# Patient Record
Sex: FEMALE | Race: BLACK OR AFRICAN AMERICAN | NOT HISPANIC OR LATINO | Employment: FULL TIME | URBAN - METROPOLITAN AREA
[De-identification: names, ages, dates, MRNs, and addresses within clinical notes are randomized per-mention and may not be internally consistent; named-entity substitution may affect disease eponyms.]

---

## 2017-07-25 ENCOUNTER — OFFICE VISIT (OUTPATIENT)
Dept: URGENT CARE | Facility: CLINIC | Age: 52
End: 2017-07-25
Payer: COMMERCIAL

## 2017-07-25 VITALS
OXYGEN SATURATION: 100 % | DIASTOLIC BLOOD PRESSURE: 98 MMHG | BODY MASS INDEX: 31.41 KG/M2 | RESPIRATION RATE: 16 BRPM | TEMPERATURE: 98 F | HEART RATE: 90 BPM | HEIGHT: 60 IN | WEIGHT: 160 LBS | SYSTOLIC BLOOD PRESSURE: 143 MMHG

## 2017-07-25 DIAGNOSIS — R53.1 WEAKNESS: Primary | ICD-10-CM

## 2017-07-25 LAB — GLUCOSE SERPL-MCNC: NORMAL MG/DL (ref 70–110)

## 2017-07-25 PROCEDURE — 82948 REAGENT STRIP/BLOOD GLUCOSE: CPT | Mod: S$GLB,,, | Performed by: NURSE PRACTITIONER

## 2017-07-25 PROCEDURE — 99204 OFFICE O/P NEW MOD 45 MIN: CPT | Mod: 25,S$GLB,, | Performed by: NURSE PRACTITIONER

## 2017-07-25 RX ORDER — METOPROLOL SUCCINATE 50 MG/1
50 TABLET, EXTENDED RELEASE ORAL DAILY
COMMUNITY
End: 2018-02-03

## 2017-07-25 RX ORDER — TRIAMTERENE AND HYDROCHLOROTHIAZIDE 37.5; 25 MG/1; MG/1
CAPSULE ORAL
COMMUNITY
Start: 2017-05-12 | End: 2022-01-28 | Stop reason: SDUPTHER

## 2017-07-25 RX ORDER — SITAGLIPTIN 100 MG/1
TABLET, FILM COATED ORAL
COMMUNITY
Start: 2017-07-24

## 2017-07-25 NOTE — PROGRESS NOTES
Subjective:       Patient ID: Evette Schultz is a 52 y.o. female.    Chief Complaint: Hypertension    Patient states she started Metoprolol today and feels as though she may faint.  Currently has an event monitor on with no calls today indicating events.  Pt states that she also takes Januvia and has not eaten today.  Has appt with EP physician at [facility] tomorrow for this and wanted to check her BP and blood sugar prior to appointment.  No other complaints.      Hypertension   This is a new problem. The current episode started today. The problem is unchanged. The problem is controlled. Pertinent negatives include no blurred vision, chest pain, headaches, orthopnea, palpitations, shortness of breath or sweats. (Patient feels like she  May faint) There are no associated agents to hypertension.     Review of Systems   Constitution: Positive for weakness. Negative for chills and fever.   HENT: Negative for headaches and sore throat.    Eyes: Negative for blurred vision.   Cardiovascular: Positive for irregular heartbeat (currently being treated for intermittent PVCs) and near-syncope. Negative for chest pain, dyspnea on exertion, leg swelling, orthopnea, palpitations and syncope.   Respiratory: Negative for shortness of breath.    Skin: Negative for rash.   Musculoskeletal: Negative for back pain and joint pain.   Gastrointestinal: Negative for abdominal pain, diarrhea, nausea and vomiting.   Psychiatric/Behavioral: The patient is not nervous/anxious.    All other systems reviewed and are negative.      Objective:      Physical Exam   Constitutional: She is oriented to person, place, and time. Vital signs are normal. She appears well-developed and well-nourished. She is cooperative.  Non-toxic appearance. She does not have a sickly appearance. She does not appear ill. No distress.   HENT:   Head: Normocephalic and atraumatic.   Right Ear: Hearing, tympanic membrane, external ear and ear canal normal.   Left Ear:  Hearing, tympanic membrane, external ear and ear canal normal.   Nose: Nose normal. No mucosal edema, rhinorrhea or nasal deformity. No epistaxis. Right sinus exhibits no maxillary sinus tenderness and no frontal sinus tenderness. Left sinus exhibits no maxillary sinus tenderness and no frontal sinus tenderness.   Mouth/Throat: Uvula is midline, oropharynx is clear and moist and mucous membranes are normal. No trismus in the jaw. Normal dentition. No uvula swelling. No posterior oropharyngeal erythema.   Eyes: Conjunctivae and lids are normal. Right eye exhibits no discharge. Left eye exhibits no discharge. No scleral icterus.   Sclera clear bilat   Neck: Trachea normal, normal range of motion, full passive range of motion without pain and phonation normal. Neck supple.   Cardiovascular: Normal rate, regular rhythm, normal heart sounds, intact distal pulses and normal pulses.   Occasional extrasystoles are present.   Pulmonary/Chest: Effort normal and breath sounds normal. No respiratory distress.   Abdominal: Soft. Normal appearance and bowel sounds are normal. She exhibits no distension, no pulsatile midline mass and no mass. There is no tenderness.   Musculoskeletal: Normal range of motion. She exhibits no edema or deformity.   Neurological: She is alert and oriented to person, place, and time. She exhibits normal muscle tone. Coordination normal.   Skin: Skin is warm, dry and intact. She is not diaphoretic. No pallor.   Psychiatric: She has a normal mood and affect. Her speech is normal and behavior is normal. Judgment and thought content normal. Cognition and memory are normal.   Nursing note and vitals reviewed.      Results for orders placed or performed in visit on 07/25/17   POCT glucose   Result Value Ref Range    POC Glucose 139mg/dl 70 - 110 mg/dL     Vitals:    07/25/17 1835 (initial VS) 07/25/17 1906 (orthostatics begin here) 07/25/17 1908 07/25/17 1909   BP: 138/77 (!) 148/88 (!) 152/83 (!) 143/98    BP Location: Right arm      Patient Position: Sitting Lying Sitting Standing   BP Method: Automatic      Pulse: 82 81 80 90   Resp: 16      Temp: 97.6 °F (36.4 °C)      TempSrc: Oral      SpO2: 100%      Weight: 72.6 kg (160 lb)      Height: 5' (1.524 m)        Assessment:       1. Weakness        Plan:       Evette was seen today for hypertension.    Diagnoses and all orders for this visit:    Weakness  -     POCT glucose    MDM:  Discussed with patient need to go to ER for worsening of symptoms, agrees to this.  F/u in AM already scheduled.    Please return here or go to the Emergency Department for any concerns or worsening of condition.  If you were prescribed antibiotics, please take them to completion.  If you were prescribed a narcotic medication, do not drive or operate heavy equipment or machinery while taking these medications.  Please follow up with your primary care doctor or specialist as needed.    If you  smoke, please stop smoking.

## 2017-07-26 NOTE — PATIENT INSTRUCTIONS
Please return here or go to the Emergency Department for any concerns or worsening of condition.  If you were prescribed antibiotics, please take them to completion.  If you were prescribed a narcotic medication, do not drive or operate heavy equipment or machinery while taking these medications.  Please follow up with your primary care doctor or specialist as needed.  If you  smoke, please stop smoking.    Weakness (Uncertain Cause)  Based on your exam today, the exact cause of your weakness is not certain. However, your weakness does not seem to be a sign of a serious illness at this time. Keep an eye on your symptoms and get medical advice as instructed below.  Home care  · Rest at home today. Do not over-exert yourself.  · Take any medicine as prescribed.  · For the next few days, drink extra fluids (unless your healthcare provider wants you to restrict fluids for other reasons). Do not skip meals.  Follow-up care  Follow up with your healthcare provider or as advised.  When to seek medical advice  Call your healthcare provider for any of the following  · Worsening of your symptoms  · Symptoms don't start getting better within 2 days  · Fever of 100.4º F (38º C) or higher, or as directed by your healthcare provider·    Call 911  Get emergency medical care for any of these:  · Chest, arm, neck, jaw or upper back pain  · Trouble breathing  · Numbness or weakness of the face, one arm or one leg  · Slurred speech, confusion, trouble speaking, walking or seeing  · Blood in vomit or stool (black or red color)  · Loss of consciousness  Date Last Reviewed: 6/10/2015  © 5727-4335 [employer]. [address] [place] [address], [place] [address]. All rights reserved. This information is not intended as a substitute for professional medical care. Always follow your healthcare professional's instructions.

## 2017-11-05 DIAGNOSIS — I47.10 PAROXYSMAL SUPRAVENTRICULAR TACHYCARDIA: Primary | ICD-10-CM

## 2017-11-05 DIAGNOSIS — R00.2 PALPITATIONS: ICD-10-CM

## 2017-11-05 DIAGNOSIS — E11.9 TYPE 2 DIABETES MELLITUS: ICD-10-CM

## 2017-12-05 ENCOUNTER — HOSPITAL ENCOUNTER (OUTPATIENT)
Dept: RADIOLOGY | Facility: HOSPITAL | Age: 52
Discharge: HOME | End: 2017-12-05
Payer: COMMERCIAL

## 2017-12-05 DIAGNOSIS — I47.10 PAROXYSMAL SUPRAVENTRICULAR TACHYCARDIA: ICD-10-CM

## 2017-12-05 DIAGNOSIS — R00.2 PALPITATIONS: ICD-10-CM

## 2017-12-05 DIAGNOSIS — E11.9 TYPE 2 DIABETES MELLITUS: ICD-10-CM

## 2017-12-05 PROCEDURE — A9577 INJ MULTIHANCE: HCPCS

## 2017-12-05 PROCEDURE — 75561 CARDIAC MRI FOR MORPH W/DYE: CPT | Mod: TC

## 2017-12-05 PROCEDURE — 75561 CARDIAC MRI FOR MORPH W/DYE: CPT | Mod: 26,,, | Performed by: RADIOLOGY

## 2017-12-05 PROCEDURE — 25500020 PHARM REV CODE 255

## 2017-12-05 RX ADMIN — GADOBENATE DIMEGLUMINE 20 ML: 529 INJECTION, SOLUTION INTRAVENOUS at 08:12

## 2018-02-03 ENCOUNTER — HOSPITAL ENCOUNTER (EMERGENCY)
Facility: OTHER | Age: 53
Discharge: HOME | End: 2018-02-03
Attending: EMERGENCY MEDICINE
Payer: COMMERCIAL

## 2018-02-03 VITALS
SYSTOLIC BLOOD PRESSURE: 157 MMHG | OXYGEN SATURATION: 100 % | WEIGHT: 160 LBS | HEART RATE: 86 BPM | TEMPERATURE: 99 F | HEIGHT: 60 IN | RESPIRATION RATE: 18 BRPM | BODY MASS INDEX: 31.41 KG/M2 | DIASTOLIC BLOOD PRESSURE: 78 MMHG

## 2018-02-03 DIAGNOSIS — R00.2 PALPITATIONS: Primary | ICD-10-CM

## 2018-02-03 LAB
ALBUMIN SERPL BCP-MCNC: 4 G/DL
ALP SERPL-CCNC: 75 U/L
ALT SERPL W/O P-5'-P-CCNC: 36 U/L
ANION GAP SERPL CALC-SCNC: 13 MMOL/L
AST SERPL-CCNC: 19 U/L
BASOPHILS # BLD AUTO: 0.03 K/UL
BASOPHILS NFR BLD: 0.6 %
BILIRUB SERPL-MCNC: 0.3 MG/DL
BUN SERPL-MCNC: 13 MG/DL
CALCIUM SERPL-MCNC: 9 MG/DL
CHLORIDE SERPL-SCNC: 105 MMOL/L
CO2 SERPL-SCNC: 24 MMOL/L
CREAT SERPL-MCNC: 0.9 MG/DL
DIFFERENTIAL METHOD: ABNORMAL
EOSINOPHIL # BLD AUTO: 0.2 K/UL
EOSINOPHIL NFR BLD: 4.3 %
ERYTHROCYTE [DISTWIDTH] IN BLOOD BY AUTOMATED COUNT: 15 %
EST. GFR  (AFRICAN AMERICAN): >60 ML/MIN/1.73 M^2
EST. GFR  (NON AFRICAN AMERICAN): >60 ML/MIN/1.73 M^2
GLUCOSE SERPL-MCNC: 222 MG/DL
HCT VFR BLD AUTO: 36.1 %
HGB BLD-MCNC: 11.7 G/DL
LYMPHOCYTES # BLD AUTO: 2.3 K/UL
LYMPHOCYTES NFR BLD: 45.3 %
MAGNESIUM SERPL-MCNC: 2.1 MG/DL
MCH RBC QN AUTO: 26.5 PG
MCHC RBC AUTO-ENTMCNC: 32.4 G/DL
MCV RBC AUTO: 82 FL
MONOCYTES # BLD AUTO: 0.4 K/UL
MONOCYTES NFR BLD: 7.8 %
NEUTROPHILS # BLD AUTO: 2.2 K/UL
NEUTROPHILS NFR BLD: 41.6 %
PHOSPHATE SERPL-MCNC: 2.3 MG/DL
PLATELET # BLD AUTO: 341 K/UL
PMV BLD AUTO: 9.2 FL
POTASSIUM SERPL-SCNC: 4 MMOL/L
PROT SERPL-MCNC: 7.7 G/DL
RBC # BLD AUTO: 4.42 M/UL
SODIUM SERPL-SCNC: 142 MMOL/L
WBC # BLD AUTO: 5.16 K/UL

## 2018-02-03 PROCEDURE — 80053 COMPREHEN METABOLIC PANEL: CPT

## 2018-02-03 PROCEDURE — 93005 ELECTROCARDIOGRAM TRACING: CPT

## 2018-02-03 PROCEDURE — 99283 EMERGENCY DEPT VISIT LOW MDM: CPT | Mod: 25

## 2018-02-03 PROCEDURE — 84100 ASSAY OF PHOSPHORUS: CPT

## 2018-02-03 PROCEDURE — 83735 ASSAY OF MAGNESIUM: CPT

## 2018-02-03 PROCEDURE — 85025 COMPLETE CBC W/AUTO DIFF WBC: CPT

## 2018-02-03 PROCEDURE — 93010 ELECTROCARDIOGRAM REPORT: CPT | Mod: ,,, | Performed by: INTERNAL MEDICINE

## 2018-02-03 PROCEDURE — 25000003 PHARM REV CODE 250: Performed by: EMERGENCY MEDICINE

## 2018-02-03 RX ORDER — SODIUM,POTASSIUM PHOSPHATES 280-250MG
1 POWDER IN PACKET (EA) ORAL
Status: COMPLETED | OUTPATIENT
Start: 2018-02-03 | End: 2018-02-03

## 2018-02-03 RX ORDER — ACETAMINOPHEN 500 MG
5000 TABLET ORAL DAILY
COMMUNITY

## 2018-02-03 RX ADMIN — POTASSIUM & SODIUM PHOSPHATES POWDER PACK 280-160-250 MG 1 PACKET: 280-160-250 PACK at 10:02

## 2018-02-04 NOTE — ED NOTES
Katey monitor tech called and stated that when the patient was experiencing shortness of breath and weakness, she threw some PVC's but was underlying SR.  They were given our fax number to send the tracings.

## 2018-02-04 NOTE — ED TRIAGE NOTES
"Pt presents with SOB, onset about an hour ago while at rest. Pt was to have ablation yesterday at [facility] for v tach but procedure was unsuccessful and was placed on heart monitor. Left groin site intact. While at home at rest, pt started with a "panicky" feeling. Pt was SOB and lightheaded and palpitations about an hour ago. Pt reports hx of similar symptoms with arrhythmias in the past. Pulse , regular rhythm. Pt in no respiratory distress, able to speak in complete sentences, no increased WOB noted. Pt in NAD.   "

## 2018-02-04 NOTE — ED PROVIDER NOTES
"Encounter Date: 2/3/2018    SCRIBE #1 NOTE: I, Virginia Deleon, jorge luis scribing for, and in the presence of, Dr. Anand.       History     Chief Complaint   Patient presents with    Shortness of Breath     Patient c/o SOB and Heart Palpitations that started 15-20 min prior to arrival.  Failed had a failed cardiac ablation yesterday and is currently wearing a holter monitor.  Patient was told to come in if any changes.      Time seen by provider: 9:07 PM    This is a 52 y.o. female with type II diabetes, asthma, and arrhythmia who presents with complaint of SOB and palpitations x about one hour ago. Pt was resting at onset of palpitations with a near syncopal, "panicky" sensation. She then felt short of breath and lightheaded. Pt had failed attempt of cardiac ablation yesterday at [facility] for ventricular tachycardia. She has been placed on a holter monitor since. She contacted holter monitor hotline in order to have tracing from today's onset of symptoms sent to ED. Pt has received full cardiac work ups in the past. She reports adverse reactions with Iodine and Tegretol. Pt does not smoke, drink, or use drugs. She denies excessive caffeine consumption. She has hx of bladder surgery and 2 C-sections. She denies any chest pain, leg swelling, anxiety, dizziness, weakness, headache, syncope, fever, chills, diaphoresis, cough, N/V/D, and abdominal pain.      The history is provided by the patient.     Review of patient's allergies indicates:   Allergen Reactions    Lodine [etodolac] Hives    Tegretol [carbamazepine] Other (See Comments)     Talk jibberish     Past Medical History:   Diagnosis Date    Arrhythmia     Arthritis     Asthma     Diabetes mellitus     Diabetes mellitus, type 2     Meniere's disease of right ear      Past Surgical History:   Procedure Laterality Date     SECTION, CLASSIC      INCONTINENCE SURGERY       Family History   Problem Relation Age of Onset    Diabetes " Father     Hypertension Father      Social History   Substance Use Topics    Smoking status: Never Smoker    Smokeless tobacco: Never Used    Alcohol use Yes      Comment: socially     Review of Systems   Constitutional: Negative for chills, diaphoresis and fever.   HENT: Negative for congestion, rhinorrhea and sore throat.    Respiratory: Positive for shortness of breath. Negative for cough and wheezing.    Cardiovascular: Positive for palpitations. Negative for chest pain and leg swelling.   Gastrointestinal: Negative for abdominal pain, diarrhea, nausea and vomiting.   Endocrine: Negative for polyuria.   Genitourinary: Negative for decreased urine volume and dysuria.   Musculoskeletal: Negative for back pain.   Skin: Negative for rash.   Allergic/Immunologic: Negative for immunocompromised state.   Neurological: Positive for light-headedness. Negative for dizziness, syncope, weakness, numbness and headaches.   Hematological: Does not bruise/bleed easily.   Psychiatric/Behavioral: Negative for confusion. The patient is not nervous/anxious.        Physical Exam     Initial Vitals [02/03/18 2051]   BP Pulse Resp Temp SpO2   (!) 146/83 97 16 98.4 °F (36.9 °C) 100 %      MAP       104         Physical Exam    Nursing note and vitals reviewed.  Constitutional: She appears well-developed and well-nourished. No distress.   HENT:   Head: Normocephalic and atraumatic.   Nose: Nose normal.   Mouth/Throat: Oropharynx is clear and moist.   Eyes: Conjunctivae and EOM are normal. Pupils are equal, round, and reactive to light.   Neck: Normal range of motion. Neck supple.   Cardiovascular: Normal rate, regular rhythm and normal heart sounds.   No murmur heard.  Pulmonary/Chest: Breath sounds normal. No respiratory distress. She has no wheezes. She has no rhonchi. She has no rales.   Abdominal: Soft. Bowel sounds are normal. There is no tenderness.   Musculoskeletal: Normal range of motion.   Neurological: She is alert and  oriented to person, place, and time. She has normal strength. No cranial nerve deficit or sensory deficit.   Skin: Skin is warm and dry. No rash noted.   Psychiatric: She has a normal mood and affect. Her behavior is normal.         ED Course   Procedures  Labs Reviewed   CBC W/ AUTO DIFFERENTIAL - Abnormal; Notable for the following:        Result Value    Hemoglobin 11.7 (*)     Hematocrit 36.1 (*)     MCH 26.5 (*)     RDW 15.0 (*)     All other components within normal limits   COMPREHENSIVE METABOLIC PANEL - Abnormal; Notable for the following:     Glucose 222 (*)     All other components within normal limits   PHOSPHORUS - Abnormal; Notable for the following:     Phosphorus 2.3 (*)     All other components within normal limits   MAGNESIUM     EKG Readings: (Independently Interpreted)   NSR at a rate of 93. No STEMI. No ectopy. No abnormal T waves.          Medical Decision Making:   Independently Interpreted Test(s):   I have ordered and independently interpreted EKG Reading(s) - see prior notes  Clinical Tests:   Lab Tests: Ordered and Reviewed  Medical Tests: Ordered and Reviewed  ED Management:  Emergent evaluation a 52-year-old female with complaint of shortness of breath and palpitations ongoing times one hour.  Patient was wearing a Holter monitor when this occurred.  I received a Holter monitor report from [employer] cardiac monitoring [employer].  This shows her Holter strips during the time of symptoms.  She had an isolated PVC and otherwise normal sinus rhythm.  There is nothing concerning about these tracings.  EKG in the ER is within normal limits.  She did not experience any dysrhythmias while on cardiac monitor during ER course.  Lab workup reveals mild hypophosphatemia which was repleted orally.  I'm comfortable with discharging the patient home to follow-up with her cardiologist as already scheduled.            Scribe Attestation:   Scribe #1: I performed the above scribed service and the documentation  accurately describes the services I performed. I attest to the accuracy of the note.    Attending Attestation:           Physician Attestation for Scribe:  Physician Attestation Statement for Scribe #1: I, Dr. Anand, reviewed documentation, as scribed by Virginia Deleon in my presence, and it is both accurate and complete.                 ED Course      Clinical Impression:     1. Palpitations                               Dahiana Anand MD  02/04/18 0101

## 2018-12-14 ENCOUNTER — HOSPITAL ENCOUNTER (EMERGENCY)
Facility: OTHER | Age: 53
Discharge: HOME | End: 2018-12-14
Attending: EMERGENCY MEDICINE
Payer: COMMERCIAL

## 2018-12-14 VITALS
RESPIRATION RATE: 18 BRPM | OXYGEN SATURATION: 97 % | HEIGHT: 60 IN | TEMPERATURE: 98 F | SYSTOLIC BLOOD PRESSURE: 124 MMHG | DIASTOLIC BLOOD PRESSURE: 95 MMHG | BODY MASS INDEX: 32.98 KG/M2 | HEART RATE: 82 BPM | WEIGHT: 168 LBS

## 2018-12-14 DIAGNOSIS — I82.409 DVT (DEEP VENOUS THROMBOSIS): ICD-10-CM

## 2018-12-14 DIAGNOSIS — I82.431 ACUTE DEEP VEIN THROMBOSIS (DVT) OF POPLITEAL VEIN OF RIGHT LOWER EXTREMITY: Primary | ICD-10-CM

## 2018-12-14 LAB
ANION GAP SERPL CALC-SCNC: 14 MMOL/L
BASOPHILS # BLD AUTO: 0.04 K/UL
BASOPHILS NFR BLD: 0.6 %
BUN SERPL-MCNC: 25 MG/DL
CALCIUM SERPL-MCNC: 9.7 MG/DL
CHLORIDE SERPL-SCNC: 104 MMOL/L
CO2 SERPL-SCNC: 24 MMOL/L
CREAT SERPL-MCNC: 0.9 MG/DL
DIFFERENTIAL METHOD: ABNORMAL
EOSINOPHIL # BLD AUTO: 0.2 K/UL
EOSINOPHIL NFR BLD: 2.8 %
ERYTHROCYTE [DISTWIDTH] IN BLOOD BY AUTOMATED COUNT: 14.5 %
EST. GFR  (AFRICAN AMERICAN): >60 ML/MIN/1.73 M^2
EST. GFR  (NON AFRICAN AMERICAN): >60 ML/MIN/1.73 M^2
GLUCOSE SERPL-MCNC: 195 MG/DL
HCT VFR BLD AUTO: 37.2 %
HGB BLD-MCNC: 11.9 G/DL
LYMPHOCYTES # BLD AUTO: 3 K/UL
LYMPHOCYTES NFR BLD: 46.2 %
MCH RBC QN AUTO: 26.1 PG
MCHC RBC AUTO-ENTMCNC: 32 G/DL
MCV RBC AUTO: 82 FL
MONOCYTES # BLD AUTO: 0.4 K/UL
MONOCYTES NFR BLD: 5.5 %
NEUTROPHILS # BLD AUTO: 2.9 K/UL
NEUTROPHILS NFR BLD: 44.7 %
PLATELET # BLD AUTO: 292 K/UL
PMV BLD AUTO: 9.4 FL
POTASSIUM SERPL-SCNC: 3.8 MMOL/L
RBC # BLD AUTO: 4.56 M/UL
SODIUM SERPL-SCNC: 142 MMOL/L
WBC # BLD AUTO: 6.54 K/UL

## 2018-12-14 PROCEDURE — 80048 BASIC METABOLIC PNL TOTAL CA: CPT

## 2018-12-14 PROCEDURE — 85025 COMPLETE CBC W/AUTO DIFF WBC: CPT

## 2018-12-14 PROCEDURE — 25000003 PHARM REV CODE 250: Performed by: EMERGENCY MEDICINE

## 2018-12-14 PROCEDURE — 99284 EMERGENCY DEPT VISIT MOD MDM: CPT

## 2018-12-14 RX ADMIN — RIVAROXABAN 15 MG: 15 TABLET, FILM COATED ORAL at 07:12

## 2018-12-14 NOTE — ED PROVIDER NOTES
"Encounter Date: 2018    SCRIBE #1 NOTE: I, Marcotari Lees, am scribing for, and in the presence of, Dr. Peña.       History     Chief Complaint   Patient presents with    Deep Vein Thrombosis     R leg pain and swelling. had 1 1/2 hr flight yesterday and pain started today.      Time seen by provider: 5:22 PM    This is a 53 y.o. female who presents with complaint of pain in her right calf that began suddenly this afternoon while she was at work. The patient reports before onset of pain, she was walking around normally, then after she sat down for a couple of hours, she began feeling the pain. The patient also reports to feel a knot in her calf. The patient denies strenuous activity that would trigger the pain. She also denies chest pain, shortness of breath, dizziness, or recent swelling of her legs. The patient reports an ultrasound was done on her right calf at work and it was found that " the vein was not closing all the way", which is why she came to the ED. The patient denies blood clots in the past, or any other leg problems. The patient reports she has DM and takes metformin and januvia for it. The patient also reports she blood glucose levels are normally in the 150's. She denies being on birth control. She denies recent surgeries, or periods of prolonged immobility. She patient reports she takes aspirin 81 mg regularly.      The history is provided by the patient.     Review of patient's allergies indicates:   Allergen Reactions    Lodine [etodolac] Hives    Tegretol [carbamazepine] Other (See Comments)     Talk jibberish     Past Medical History:   Diagnosis Date    Arrhythmia     Arthritis     Asthma     Diabetes mellitus     Diabetes mellitus, type 2     Meniere's disease of right ear      Past Surgical History:   Procedure Laterality Date     SECTION, CLASSIC      INCONTINENCE SURGERY       Family History   Problem Relation Age of Onset    Diabetes Father     Hypertension " Father      Social History     Tobacco Use    Smoking status: Never Smoker    Smokeless tobacco: Never Used   Substance Use Topics    Alcohol use: Yes     Comment: socially    Drug use: No     Review of Systems   Constitutional: Negative for fever.   HENT: Negative for sore throat.    Respiratory: Negative for shortness of breath.    Cardiovascular: Negative for chest pain and leg swelling.   Gastrointestinal: Negative for nausea.   Genitourinary: Negative for dysuria.   Musculoskeletal: Positive for myalgias (Right calf.). Negative for back pain.   Skin: Negative for rash.   Neurological: Negative for dizziness and weakness.   Hematological: Does not bruise/bleed easily.       Physical Exam     Initial Vitals [12/14/18 1651]   BP Pulse Resp Temp SpO2   138/82 88 18 98.4 °F (36.9 °C) 99 %      MAP       --         Physical Exam    Nursing note and vitals reviewed.  Constitutional: She appears well-developed and well-nourished. She is not diaphoretic. No distress.   Overweight.   HENT:   Head: Normocephalic and atraumatic.   Mouth/Throat: No oropharyngeal exudate.   Eyes: Conjunctivae and EOM are normal. Pupils are equal, round, and reactive to light. No scleral icterus.   Neck: Normal range of motion. Neck supple.   Cardiovascular: Normal rate, regular rhythm, normal heart sounds and intact distal pulses. Exam reveals no gallop and no friction rub.    No murmur heard.  Musculoskeletal: Normal range of motion.   Tenderness right posterior lateral proximal calf. Calf circumferences equal. No palpable cords. No distal edema. 2 + dorsalis pedis pulses. Normal range of motion.   Neurological: She is alert and oriented to person, place, and time.   NVID.   Skin: Skin is warm and dry. No rash noted. No erythema. No pallor.         ED Course   Procedures  Labs Reviewed   CBC W/ AUTO DIFFERENTIAL - Abnormal; Notable for the following components:       Result Value    Hemoglobin 11.9 (*)     MCH 26.1 (*)     All other  components within normal limits   BASIC METABOLIC PANEL - Abnormal; Notable for the following components:    Glucose 195 (*)     BUN, Bld 25 (*)     All other components within normal limits          Imaging Results          US Lower Extremity Veins Right (Final result)  Result time 12/14/18 18:48:37    Final result by Mario Alberto Mcclellan MD (12/14/18 18:48:37)                 Impression:      Acute nonocclusive thrombus within the right popliteal vein, consistent with right-sided lower extremity DVT.    Case discussed with Dr. Peña on 12/14/2018 at 18:47 hours.      Electronically signed by: Mario Alberto Mcclellan MD  Date:    12/14/2018  Time:    18:48             Narrative:    EXAMINATION:  US LOWER EXTREMITY VEINS RIGHT    CLINICAL HISTORY:  Acute embolism and thrombosis of unspecified deep veins of unspecified lower extremity    TECHNIQUE:  Duplex and color flow Doppler evaluation and graded compression of the right lower extremity veins was performed.    COMPARISON:  None    FINDINGS:  Right thigh veins: The common femoral, femoral, upper greater saphenous, and deep femoral veins are patent and free of thrombus.  There is a nonocclusive thrombus within the right popliteal vein.    Right calf veins: The visualized calf veins are patent.    Contralateral CFV: The contralateral (left) common femoral vein is patent and free of thrombus.    Miscellaneous: None                                 Medical Decision Making:   Clinical Tests:   Lab Tests: Ordered and Reviewed  Radiological Study: Ordered and Reviewed            Scribe Attestation:   Scribe #1: I performed the above scribed service and the documentation accurately describes the services I performed. I attest to the accuracy of the note.    Attending Attestation:           Physician Attestation for Scribe:  Physician Attestation Statement for Scribe #1: I, Dr. Peña, reviewed documentation, as scribed by Tess Lees in my presence, and it is both accurate and complete.            Patient presents with acute onset of pain in her right calf earlier today.  No trauma. Somewhat worse with walking around.  No chest pain shortness of breath dizziness syncope.  No history of DVT, no tobacco use.  She did have a short plane flight, hour and a half.  A few days ago.  No other risk factors for DVT ultrasound does confirm DVT in the leg, popliteal patient is appropriate for outpatient therapy will be started on Xarelto.  Return precautions discussed, specifically signs of worsening DVT or PE follow-up with primary care devise that will need dose adjustment after the 3 week prescription             Clinical Impression:     1. Acute deep vein thrombosis (DVT) of popliteal vein of right lower extremity    2. DVT (deep venous thrombosis)                                   Julian Peña II, MD  12/14/18 1950

## 2018-12-14 NOTE — ED NOTES
Pt presents with c/o R calf pain, onset today. Pt recently came off of a flight yesterday. Pt reports she noticed a lump in the R calf after the pain started. + nodule felt to Right calf, no redness, swelling or warmth noted. Pt is AAOx4. RR are even and unlabored. Pt is ambulatory with steady gait.

## 2019-07-29 ENCOUNTER — HOSPITAL ENCOUNTER (EMERGENCY)
Facility: OTHER | Age: 54
Discharge: HOME | End: 2019-07-29
Attending: EMERGENCY MEDICINE
Payer: COMMERCIAL

## 2019-07-29 VITALS
RESPIRATION RATE: 20 BRPM | DIASTOLIC BLOOD PRESSURE: 60 MMHG | SYSTOLIC BLOOD PRESSURE: 125 MMHG | OXYGEN SATURATION: 97 % | HEIGHT: 60 IN | TEMPERATURE: 98 F | BODY MASS INDEX: 32.59 KG/M2 | WEIGHT: 166 LBS | HEART RATE: 72 BPM

## 2019-07-29 DIAGNOSIS — R07.9 CHEST PAIN: Primary | ICD-10-CM

## 2019-07-29 DIAGNOSIS — N39.0 URINARY TRACT INFECTION WITHOUT HEMATURIA, SITE UNSPECIFIED: ICD-10-CM

## 2019-07-29 LAB
ALBUMIN SERPL BCP-MCNC: 4 G/DL (ref 3.5–5.2)
ALP SERPL-CCNC: 69 U/L (ref 55–135)
ALT SERPL W/O P-5'-P-CCNC: 24 U/L (ref 10–44)
ANION GAP SERPL CALC-SCNC: 11 MMOL/L (ref 8–16)
AST SERPL-CCNC: 19 U/L (ref 10–40)
BACTERIA #/AREA URNS HPF: ABNORMAL /HPF
BASOPHILS # BLD AUTO: 0.07 K/UL (ref 0–0.2)
BASOPHILS NFR BLD: 1.2 % (ref 0–1.9)
BILIRUB SERPL-MCNC: 0.4 MG/DL (ref 0.1–1)
BILIRUB UR QL STRIP: NEGATIVE
BNP SERPL-MCNC: 11 PG/ML (ref 0–99)
BUN SERPL-MCNC: 18 MG/DL (ref 6–20)
CALCIUM SERPL-MCNC: 9.4 MG/DL (ref 8.7–10.5)
CHLORIDE SERPL-SCNC: 103 MMOL/L (ref 95–110)
CLARITY UR: ABNORMAL
CO2 SERPL-SCNC: 24 MMOL/L (ref 23–29)
COLOR UR: YELLOW
CREAT SERPL-MCNC: 0.8 MG/DL (ref 0.5–1.4)
DIFFERENTIAL METHOD: ABNORMAL
EOSINOPHIL # BLD AUTO: 0.1 K/UL (ref 0–0.5)
EOSINOPHIL NFR BLD: 1.9 % (ref 0–8)
ERYTHROCYTE [DISTWIDTH] IN BLOOD BY AUTOMATED COUNT: 14.4 % (ref 11.5–14.5)
EST. GFR  (AFRICAN AMERICAN): >60 ML/MIN/1.73 M^2
EST. GFR  (NON AFRICAN AMERICAN): >60 ML/MIN/1.73 M^2
GLUCOSE SERPL-MCNC: 271 MG/DL (ref 70–110)
GLUCOSE UR QL STRIP: ABNORMAL
HCT VFR BLD AUTO: 39.6 % (ref 37–48.5)
HGB BLD-MCNC: 12.4 G/DL (ref 12–16)
HGB UR QL STRIP: ABNORMAL
IMM GRANULOCYTES # BLD AUTO: 0.02 K/UL (ref 0–0.04)
IMM GRANULOCYTES NFR BLD AUTO: 0.3 % (ref 0–0.5)
KETONES UR QL STRIP: NEGATIVE
LEUKOCYTE ESTERASE UR QL STRIP: ABNORMAL
LYMPHOCYTES # BLD AUTO: 2.3 K/UL (ref 1–4.8)
LYMPHOCYTES NFR BLD: 38.6 % (ref 18–48)
MCH RBC QN AUTO: 25.7 PG (ref 27–31)
MCHC RBC AUTO-ENTMCNC: 31.3 G/DL (ref 32–36)
MCV RBC AUTO: 82 FL (ref 82–98)
MICROSCOPIC COMMENT: ABNORMAL
MONOCYTES # BLD AUTO: 0.4 K/UL (ref 0.3–1)
MONOCYTES NFR BLD: 7.4 % (ref 4–15)
NEUTROPHILS # BLD AUTO: 3 K/UL (ref 1.8–7.7)
NEUTROPHILS NFR BLD: 50.6 % (ref 38–73)
NITRITE UR QL STRIP: POSITIVE
NRBC BLD-RTO: 0 /100 WBC
PH UR STRIP: 6 [PH] (ref 5–8)
PLATELET # BLD AUTO: 303 K/UL (ref 150–350)
PMV BLD AUTO: 9 FL (ref 9.2–12.9)
POTASSIUM SERPL-SCNC: 4.1 MMOL/L (ref 3.5–5.1)
PROT SERPL-MCNC: 7.8 G/DL (ref 6–8.4)
PROT UR QL STRIP: NEGATIVE
RBC # BLD AUTO: 4.83 M/UL (ref 4–5.4)
RBC #/AREA URNS HPF: 0 /HPF (ref 0–4)
SODIUM SERPL-SCNC: 138 MMOL/L (ref 136–145)
SP GR UR STRIP: 1.02 (ref 1–1.03)
SQUAMOUS #/AREA URNS HPF: 5 /HPF
TROPONIN I SERPL DL<=0.01 NG/ML-MCNC: <0.006 NG/ML (ref 0–0.03)
TSH SERPL DL<=0.005 MIU/L-ACNC: 0.74 UIU/ML (ref 0.4–4)
URN SPEC COLLECT METH UR: ABNORMAL
UROBILINOGEN UR STRIP-ACNC: NEGATIVE EU/DL
WBC # BLD AUTO: 5.85 K/UL (ref 3.9–12.7)
WBC #/AREA URNS HPF: 18 /HPF (ref 0–5)

## 2019-07-29 PROCEDURE — 87186 SC STD MICRODIL/AGAR DIL: CPT

## 2019-07-29 PROCEDURE — 25500020 PHARM REV CODE 255: Performed by: EMERGENCY MEDICINE

## 2019-07-29 PROCEDURE — 81000 URINALYSIS NONAUTO W/SCOPE: CPT

## 2019-07-29 PROCEDURE — 87086 URINE CULTURE/COLONY COUNT: CPT

## 2019-07-29 PROCEDURE — 93005 ELECTROCARDIOGRAM TRACING: CPT

## 2019-07-29 PROCEDURE — 84443 ASSAY THYROID STIM HORMONE: CPT

## 2019-07-29 PROCEDURE — 83880 ASSAY OF NATRIURETIC PEPTIDE: CPT

## 2019-07-29 PROCEDURE — 85025 COMPLETE CBC W/AUTO DIFF WBC: CPT

## 2019-07-29 PROCEDURE — 99284 EMERGENCY DEPT VISIT MOD MDM: CPT | Mod: 25

## 2019-07-29 PROCEDURE — 80053 COMPREHEN METABOLIC PANEL: CPT

## 2019-07-29 PROCEDURE — 84484 ASSAY OF TROPONIN QUANT: CPT

## 2019-07-29 PROCEDURE — 87088 URINE BACTERIA CULTURE: CPT

## 2019-07-29 PROCEDURE — 87077 CULTURE AEROBIC IDENTIFY: CPT

## 2019-07-29 PROCEDURE — 93010 EKG 12-LEAD: ICD-10-PCS | Mod: ,,, | Performed by: INTERNAL MEDICINE

## 2019-07-29 PROCEDURE — 93010 ELECTROCARDIOGRAM REPORT: CPT | Mod: ,,, | Performed by: INTERNAL MEDICINE

## 2019-07-29 RX ORDER — NEBIVOLOL 5 MG/1
10 TABLET ORAL DAILY
COMMUNITY

## 2019-07-29 RX ORDER — NITROFURANTOIN 25; 75 MG/1; MG/1
100 CAPSULE ORAL 2 TIMES DAILY
Qty: 10 CAPSULE | Refills: 0 | Status: SHIPPED | OUTPATIENT
Start: 2019-07-29 | End: 2019-08-03

## 2019-07-29 RX ORDER — GLIPIZIDE 5 MG/1
5 TABLET, FILM COATED, EXTENDED RELEASE ORAL
COMMUNITY

## 2019-07-29 RX ADMIN — IOHEXOL 75 ML: 350 INJECTION, SOLUTION INTRAVENOUS at 09:07

## 2019-07-30 NOTE — ED PROVIDER NOTES
Encounter Date: 2019    SCRIBE #1 NOTE: I, Iglesia Lyles, am scribing for, and in the presence of, Dr. Anand.       History     Chief Complaint   Patient presents with    Chest Pain     chest pain described as a heaviness and dizziness.      Time seen by provider: 8:23 PM    This is a 54 y.o. female with a history of meniere's disease of the right ear, DM, asthma, and arrhythmia who presents with complaint of chest pain that began approximately one week ago.  Pain is located in the left upper chest, and does not radiate. The patient state that her pain became constant this morning. She describes the pain as a heaviness/pressure. She denies shortness of breath, but states that it feels like she has trouble taking a deep breath. The patient was seen in the ED on 19 and treated for a DVT. Since the DVT, the patient's right lower extremity is intermittently swollen, although not at present.   She reports that she is also experiencing intermittent dizziness. The patient reports that her PCP has been working her up for dizziness that she has been going on intermittently for a while. She states that the dizziness she has been experiencing is different from her meniere's. She denies fever, chills, congestion, sore throat, chest pain, shortness of breath, palpitations, nausea, abdominal pain, and dysuria. The patient denies smoking, drinking, and illicit drug use.     The history is provided by the patient.     Review of patient's allergies indicates:   Allergen Reactions    Lodine [etodolac] Hives    Tegretol [carbamazepine] Other (See Comments)     Talk jibberish     Past Medical History:   Diagnosis Date    Arrhythmia     Arthritis     Asthma     Diabetes mellitus     Diabetes mellitus, type 2     Meniere's disease of right ear      Past Surgical History:   Procedure Laterality Date     SECTION, CLASSIC      INCONTINENCE SURGERY       Family History   Problem Relation Age of Onset     Diabetes Father     Hypertension Father      Social History     Tobacco Use    Smoking status: Never Smoker    Smokeless tobacco: Never Used   Substance Use Topics    Alcohol use: Yes     Comment: socially    Drug use: No     Review of Systems   Constitutional: Negative for chills and fever.   HENT: Negative for congestion and sore throat.    Eyes: Negative for visual disturbance.   Respiratory: Negative for cough and shortness of breath.    Cardiovascular: Positive for chest pain (described as a pressure/heaviness). Negative for palpitations.   Gastrointestinal: Negative for abdominal pain, diarrhea and vomiting.   Genitourinary: Negative for decreased urine volume, dysuria and vaginal discharge.   Musculoskeletal: Negative for joint swelling, neck pain and neck stiffness.   Skin: Negative for rash and wound.   Neurological: Positive for dizziness. Negative for weakness, numbness and headaches.   Psychiatric/Behavioral: Negative for confusion.       Physical Exam     Initial Vitals [07/29/19 1955]   BP Pulse Resp Temp SpO2   (!) 154/80 83 16 98.9 °F (37.2 °C) 100 %      MAP       --         Physical Exam    Nursing note and vitals reviewed.  Constitutional: She appears well-developed and well-nourished. She is not diaphoretic. No distress.   HENT:   Head: Normocephalic and atraumatic.   Mouth/Throat: Oropharynx is clear and moist.   Eyes: EOM are normal. Pupils are equal, round, and reactive to light. Right eye exhibits no discharge. Left eye exhibits no discharge.   Neck: Normal range of motion.   Cardiovascular: Normal rate, regular rhythm, normal heart sounds and intact distal pulses. Exam reveals no gallop and no friction rub.    No murmur heard.  Radial pulses 2+ and equal.   Pulmonary/Chest: Breath sounds normal. No respiratory distress. She has no wheezes. She has no rhonchi. She has no rales.   Abdominal: Soft. There is no tenderness. There is no rebound and no guarding.   Musculoskeletal: Normal range  of motion. She exhibits no edema or tenderness.   Neurological: She is alert and oriented to person, place, and time. She has normal strength. No cranial nerve deficit or sensory deficit. GCS score is 15. GCS eye subscore is 4. GCS verbal subscore is 5. GCS motor subscore is 6.   Skin: Skin is warm and dry. No rash and no abscess noted. No erythema. No pallor.   Psychiatric: She has a normal mood and affect. Her behavior is normal. Judgment and thought content normal.         ED Course   Procedures  Labs Reviewed   CBC W/ AUTO DIFFERENTIAL - Abnormal; Notable for the following components:       Result Value    Mean Corpuscular Hemoglobin 25.7 (*)     Mean Corpuscular Hemoglobin Conc 31.3 (*)     MPV 9.0 (*)     All other components within normal limits   COMPREHENSIVE METABOLIC PANEL - Abnormal; Notable for the following components:    Glucose 271 (*)     All other components within normal limits   URINALYSIS, REFLEX TO URINE CULTURE - Abnormal; Notable for the following components:    Appearance, UA Hazy (*)     Glucose, UA 1+ (*)     Occult Blood UA Trace (*)     Nitrite, UA Positive (*)     Leukocytes, UA 1+ (*)     All other components within normal limits    Narrative:     Preferred Collection Type->Urine, Clean Catch   URINALYSIS MICROSCOPIC - Abnormal; Notable for the following components:    WBC, UA 18 (*)     Bacteria Few (*)     All other components within normal limits    Narrative:     Preferred Collection Type->Urine, Clean Catch   CULTURE, URINE   TROPONIN I   TSH   B-TYPE NATRIURETIC PEPTIDE     EKG Readings: (Independently Interpreted)   Initial Reading: No STEMI.   Normal sinus rhythm at a rate of 68 bpm. No STEMI. Non specific T wave abnormality in lead 3 only.        Imaging Results          CTA Chest Non-Coronary (PE Study) (Final result)  Result time 07/29/19 22:06:54    Final result by Gloria Pino MD (07/29/19 22:06:54)                 Impression:      No pulmonary embolism.    5.8 mm  indeterminate nodule left upper lobe.  3 mm indeterminate nodule right upper lobe.  If there are risk factors, follow-up CT at 12 months is optional per Flesalner 2017.    No acute lung disease or interstitial edema.      Electronically signed by: Gloria Odette  Date:    07/29/2019  Time:    22:06             Narrative:    EXAMINATION:  CTA CHEST NON CORONARY    CLINICAL HISTORY:  Chest pain, acute, PE suspected, high pretest prob;    TECHNIQUE:  Low dose axial images, sagittal and coronal reformations were obtained from the thoracic inlet to the lung bases following the IV administration of 75 mL of Omnipaque 350.  Contrast timing was optimized to evaluate the pulmonary arteries.  MIP images were performed.    COMPARISON:  None    FINDINGS:  Mediastinum: The heart is normal size.  No pericardial effusion.  The thoracic aorta appears normal.  The visualized esophagus in thyroid appear grossly normal.  No hilar or mediastinal or axillary or supraclavicular adenopathy found.    Abdomen: The visualized liver, spleen, left kidney and stomach appear normal.    Pulmonary arteries: Main pulmonary artery measures 2.3 cm within normal limits.  The bilateral pulmonary artery trunks and lobar and segmental pulmonary arteries appear normal.    Pleura: No pneumothorax or pleural effusion.    Lungs: No interstitial edema or bronchiectasis or honeycombing.  No gross endobronchial debris or fluid or wall thickening.  No ground-glass disease pattern or subpleural reticulation.  No consolidation.  MIP images are reviewed.  There is a 5.8 mm nodule of the left upper lobe abutting the major fissure axial image 259.  There is a 3 mm nodule right upper lobe axial image 259.    Skeleton: No rib fracture.  No compression deformity or subluxation of the visualized spine.  Sternum appears intact.                                 Medical Decision Making:   Independently Interpreted Test(s):   I have ordered and independently interpreted EKG  Reading(s) - see prior notes  Clinical Tests:   Lab Tests: Ordered and Reviewed  Radiological Study: Ordered and Reviewed  Medical Tests: Ordered and Reviewed  ED Management:  Emergent evaluation a 54-year-old female with complaint of chest pain, constant today, intermittent for several weeks.  She also reports intermittent dizziness.  Vital signs reveal mild hypertension, afebrile.  Physical exam is benign.  EKG shows no acute ischemic change, nonspecific abnormality.  Pain is been ongoing all day, and screening troponin is negative.  Given the long duration of pain I do not think a repeat troponin is necessary at this time.  Patient does have a history of DVT and is not currently anticoagulated.  I was concerned for possible PE.  CTA shows no PE.  There is no pneumonia or pneumothorax.  Workup does reveal UTI.  She is discharged in good condition with prescription for Macrobid for this.  Vital signs remained stable and no dysrhythmias throughout ER course.  She is in fact wearing a Holter monitor.  She is encouraged to keep her close follow-up and to return to the ED for new or worsening symptoms.                      Clinical Impression:     1. Chest pain    2. Urinary tract infection without hematuria, site unspecified                                   Dahiana Anand MD  07/30/19 4647

## 2019-08-01 LAB — BACTERIA UR CULT: ABNORMAL

## 2019-12-13 ENCOUNTER — HOSPITAL ENCOUNTER (EMERGENCY)
Facility: OTHER | Age: 54
Discharge: HOME | End: 2019-12-13
Attending: EMERGENCY MEDICINE
Payer: COMMERCIAL

## 2019-12-13 VITALS
BODY MASS INDEX: 32.59 KG/M2 | HEART RATE: 89 BPM | DIASTOLIC BLOOD PRESSURE: 75 MMHG | SYSTOLIC BLOOD PRESSURE: 174 MMHG | OXYGEN SATURATION: 99 % | RESPIRATION RATE: 18 BRPM | WEIGHT: 166 LBS | HEIGHT: 60 IN | TEMPERATURE: 99 F

## 2019-12-13 DIAGNOSIS — M79.89 LEG SWELLING: Primary | ICD-10-CM

## 2019-12-13 DIAGNOSIS — M79.89 CALF SWELLING: ICD-10-CM

## 2019-12-13 PROCEDURE — 99284 EMERGENCY DEPT VISIT MOD MDM: CPT | Mod: 25

## 2019-12-13 RX ORDER — FLECAINIDE ACETATE 50 MG/1
50 TABLET ORAL EVERY 12 HOURS
COMMUNITY

## 2019-12-13 RX ORDER — ACEBUTOLOL HYDROCHLORIDE 200 MG/1
200 CAPSULE ORAL 2 TIMES DAILY
COMMUNITY

## 2019-12-13 NOTE — ED PROVIDER NOTES
Encounter Date: 2019       History     Chief Complaint   Patient presents with    Leg Swelling     +Pt reporting R calf swelling with pain x2 hours, PMH of DVT. Currently on Xarelto. Denies SOB.      54-year-old female with history of DVT currently on Xarelto, diabetes, asthma, arthritis, Meniere's disease of the right ear, arrhythmia presents to the emergency department with complaints of right calf swelling for the last hour.  She reports history of DVT and is on long-term anticoagulants.  She states that she was diagnosed last December and was treated for 3 months and then the Xarelto was discontinued secondary to it being an provoked DVT.  She states that it was not provoked and was restarted on Xarelto in September.  She denies chest pain or shortness of breath.  She states that someone at work ultrasounded her leg and advised her to come to the ER due to concern for possible new DVT.  Patient reports pain to the posterior popliteal fossa.    The history is provided by the patient.     Review of patient's allergies indicates:   Allergen Reactions    Lodine [etodolac] Hives    Tegretol [carbamazepine] Other (See Comments)     Talk jibberish     Past Medical History:   Diagnosis Date    Anticoagulant long-term use     Arrhythmia     Arthritis     Asthma     Diabetes mellitus     Diabetes mellitus, type 2     Meniere's disease of right ear      Past Surgical History:   Procedure Laterality Date     SECTION, CLASSIC      INCONTINENCE SURGERY       Family History   Problem Relation Age of Onset    Diabetes Father     Hypertension Father      Social History     Tobacco Use    Smoking status: Never Smoker    Smokeless tobacco: Never Used   Substance Use Topics    Alcohol use: Yes     Comment: socially    Drug use: No     Review of Systems   Constitutional: Negative for chills and fever.   Respiratory: Negative for shortness of breath.    Cardiovascular: Positive for leg swelling.  Negative for chest pain.   Musculoskeletal: Positive for arthralgias and myalgias.        Right lower leg   Skin: Negative for rash.   Neurological: Negative for weakness and numbness.   Hematological: Does not bruise/bleed easily.       Physical Exam     Initial Vitals [12/13/19 1627]   BP Pulse Resp Temp SpO2   (!) 174/75 89 18 98.6 °F (37 °C) 99 %      MAP       --         Physical Exam    Nursing note and vitals reviewed.  Constitutional: She appears well-developed and well-nourished. She is not diaphoretic.  Non-toxic appearance. No distress.   HENT:   Head: Normocephalic and atraumatic.   Right Ear: External ear normal.   Left Ear: External ear normal.   Nose: Nose normal.   Mouth/Throat: Uvula is midline, oropharynx is clear and moist and mucous membranes are normal. Mucous membranes are not dry.   Eyes: Conjunctivae and lids are normal. No scleral icterus.   Neck: Normal range of motion and phonation normal.   Cardiovascular: Normal rate, regular rhythm, normal heart sounds, intact distal pulses and normal pulses. Exam reveals no decreased pulses.    Pulses:       Dorsalis pedis pulses are 2+ on the right side, and 2+ on the left side.   Pulmonary/Chest: Effort normal and breath sounds normal. No respiratory distress. She has no decreased breath sounds. She has no wheezes. She has no rhonchi. She has no rales.   Musculoskeletal: Normal range of motion.   Negative Victorino sign. No significant swelling noted to either lower extremities. No palpable cords or masses. No color change. FROM of the knee. Intact distal pulses with no sensory deficits.    Neurological: She is alert and oriented to person, place, and time.   Skin: Skin is warm, dry and intact. Capillary refill takes less than 2 seconds. No abrasion, no bruising, no burn, no ecchymosis, no laceration, no lesion, no rash and no abscess noted. No erythema.   Psychiatric: She has a normal mood and affect. Her speech is normal and behavior is normal.  Judgment normal. Cognition and memory are normal.         ED Course   Procedures  Labs Reviewed - No data to display       Imaging Results          US Lower Extremity Veins Right (Final result)  Result time 12/13/19 17:40:25    Final result by Ari Powell MD (12/13/19 17:40:25)                 Impression:      No evidence of deep venous thrombosis in the right lower extremity.  Previously identified thrombus within the right popliteal vein no longer identified.      Electronically signed by: Ari Powell MD  Date:    12/13/2019  Time:    17:40             Narrative:    EXAMINATION:  US LOWER EXTREMITY VEINS RIGHT    CLINICAL HISTORY:  Other specified soft tissue disorders    TECHNIQUE:  Duplex and color flow Doppler evaluation and graded compression of the right lower extremity veins was performed.    COMPARISON:  12/14/2018    FINDINGS:  Duplex and color flow Doppler evaluation does not reveal any evidence of acute venous thrombosis in the common femoral, superficial femoral, greater saphenous, popliteal, peroneal, anterior tibial and posterior tibial veins of the right lower extremity.  There is no reflux to suggest valvular incompetence.                                 Medical Decision Making:   History:   Old Medical Records: I decided to obtain old medical records.  Initial Assessment:   54-year-old female with complaints consistent with right leg swelling.  Afebrile neurovascularly intact.  She is alert and healthy and nontoxic appearing.  She is not distressed.  Exam documented above.  No obvious swelling noted on my exam.  No palpable cords, masses or lesions. No evidence of serious bacterial infection, cellulitis, abscess or lymphangitis.  Patient with known history of DVT.  Expresses concern for possible thrombosis.  I did consider popliteal cyst as source of patient's symptoms.  Clinical Tests:   Radiological Study: Ordered and Reviewed  ED Management:  Ultrasound of the right lower extremity  obtained with no evidence of DVT.  Will discharge home with care instructions.  She is urged to follow up with her doctor on Monday or return to the emergency department for any worsening signs or symptoms. She is urged to continue taking her medications as prescribed.  She states understanding and is in agreement this plan.  This is the extent of patient's complaints today.  This note was created using [employer] dictation.  There may be typographical errors secondary to dictation.                                   Clinical Impression:     1. Leg swelling    2. Calf swelling            Disposition:   Disposition: Discharged  Condition: Stable                     Ct Gonzalez PA-C  12/13/19 6003

## 2019-12-13 NOTE — ED NOTES
Hx DVT with chronic R leg swelling. Reports right back of calf pain starting today 3/10. Pulses present to BLE.

## 2021-06-30 ENCOUNTER — TELEPHONE (OUTPATIENT)
Dept: OTOLARYNGOLOGY | Facility: CLINIC | Age: 56
End: 2021-06-30

## 2021-07-02 ENCOUNTER — TELEPHONE (OUTPATIENT)
Dept: OTOLARYNGOLOGY | Facility: CLINIC | Age: 56
End: 2021-07-02

## 2021-07-07 DIAGNOSIS — H91.90 HEARING DISORDER, UNSPECIFIED LATERALITY: Primary | ICD-10-CM

## 2021-10-20 ENCOUNTER — TELEPHONE (OUTPATIENT)
Dept: OTOLARYNGOLOGY | Facility: CLINIC | Age: 56
End: 2021-10-20

## 2021-11-22 ENCOUNTER — CLINICAL SUPPORT (OUTPATIENT)
Dept: OTOLARYNGOLOGY | Facility: CLINIC | Age: 56
End: 2021-11-22
Payer: COMMERCIAL

## 2021-11-22 ENCOUNTER — OFFICE VISIT (OUTPATIENT)
Dept: OTOLARYNGOLOGY | Facility: CLINIC | Age: 56
End: 2021-11-22
Payer: COMMERCIAL

## 2021-11-22 VITALS
WEIGHT: 157.88 LBS | TEMPERATURE: 98 F | HEIGHT: 60 IN | SYSTOLIC BLOOD PRESSURE: 113 MMHG | BODY MASS INDEX: 30.99 KG/M2 | HEART RATE: 81 BPM | DIASTOLIC BLOOD PRESSURE: 70 MMHG

## 2021-11-22 DIAGNOSIS — Z97.4 WEARS HEARING AID IN RIGHT EAR: ICD-10-CM

## 2021-11-22 DIAGNOSIS — R29.2 ABNORMAL ACOUSTIC REFLEX: ICD-10-CM

## 2021-11-22 DIAGNOSIS — H93.299 REDUCED SPEECH DISCRIMINATION: ICD-10-CM

## 2021-11-22 DIAGNOSIS — H90.3 ASYMMETRICAL SENSORINEURAL HEARING LOSS: ICD-10-CM

## 2021-11-22 DIAGNOSIS — H81.01 MENIERE'S DISEASE OF RIGHT EAR: ICD-10-CM

## 2021-11-22 DIAGNOSIS — Z82.0 FAMILY HISTORY OF MIGRAINE: ICD-10-CM

## 2021-11-22 DIAGNOSIS — Z86.39 HISTORY OF DIABETES MELLITUS: ICD-10-CM

## 2021-11-22 DIAGNOSIS — H93.11 TINNITUS, RIGHT EAR: ICD-10-CM

## 2021-11-22 DIAGNOSIS — Z86.69 HISTORY OF MIGRAINE: ICD-10-CM

## 2021-11-22 DIAGNOSIS — H90.3 ASYMMETRICAL SENSORINEURAL HEARING LOSS: Primary | ICD-10-CM

## 2021-11-22 DIAGNOSIS — Z71.89 ENCOUNTER FOR HEARING AID CONSULTATION: Primary | ICD-10-CM

## 2021-11-22 DIAGNOSIS — Z85.6 H/O: CML (CHRONIC MYELOID LEUKEMIA): ICD-10-CM

## 2021-11-22 PROCEDURE — 99499 UNLISTED E&M SERVICE: CPT | Mod: S$GLB,,, | Performed by: AUDIOLOGIST-HEARING AID FITTER

## 2021-11-22 PROCEDURE — 92550 PR TYMPANOMETRY AND REFLEX THRESHOLD MEASUREMENTS: ICD-10-PCS | Mod: S$GLB,,, | Performed by: AUDIOLOGIST-HEARING AID FITTER

## 2021-11-22 PROCEDURE — 99204 OFFICE O/P NEW MOD 45 MIN: CPT | Mod: S$GLB,,, | Performed by: OTOLARYNGOLOGY

## 2021-11-22 PROCEDURE — 92550 TYMPANOMETRY & REFLEX THRESH: CPT | Mod: S$GLB,,, | Performed by: AUDIOLOGIST-HEARING AID FITTER

## 2021-11-22 PROCEDURE — 99499 NO LOS: ICD-10-PCS | Mod: S$GLB,,, | Performed by: AUDIOLOGIST-HEARING AID FITTER

## 2021-11-22 PROCEDURE — V5160 PR DISPENSING FEE BINAURAL: ICD-10-PCS | Mod: S$GLB,,, | Performed by: AUDIOLOGIST-HEARING AID FITTER

## 2021-11-22 PROCEDURE — V5160 DISPENSING FEE BINAURAL: HCPCS | Mod: S$GLB,,, | Performed by: AUDIOLOGIST-HEARING AID FITTER

## 2021-11-22 PROCEDURE — 99204 PR OFFICE/OUTPT VISIT, NEW, LEVL IV, 45-59 MIN: ICD-10-PCS | Mod: S$GLB,,, | Performed by: OTOLARYNGOLOGY

## 2021-11-22 PROCEDURE — 92557 PR COMPREHENSIVE HEARING TEST: ICD-10-PCS | Mod: S$GLB,,, | Performed by: AUDIOLOGIST-HEARING AID FITTER

## 2021-11-22 PROCEDURE — 92557 COMPREHENSIVE HEARING TEST: CPT | Mod: S$GLB,,, | Performed by: AUDIOLOGIST-HEARING AID FITTER

## 2021-11-22 RX ORDER — INSULIN GLARGINE 100 [IU]/ML
INJECTION, SOLUTION SUBCUTANEOUS
COMMUNITY
Start: 2021-10-13

## 2021-12-20 ENCOUNTER — TELEPHONE (OUTPATIENT)
Dept: OTOLARYNGOLOGY | Facility: CLINIC | Age: 56
End: 2021-12-20
Payer: COMMERCIAL

## 2022-01-03 ENCOUNTER — TELEPHONE (OUTPATIENT)
Dept: OTOLARYNGOLOGY | Facility: CLINIC | Age: 57
End: 2022-01-03
Payer: COMMERCIAL

## 2022-01-03 NOTE — TELEPHONE ENCOUNTER
Spoke with patient to come at 2:30pm to see Dr. Johnson / SUSAN saldivar are in and see Dr. Ramirez at 3:30pm

## 2022-01-10 ENCOUNTER — CLINICAL SUPPORT (OUTPATIENT)
Dept: OTOLARYNGOLOGY | Facility: CLINIC | Age: 57
End: 2022-01-10
Payer: COMMERCIAL

## 2022-01-10 DIAGNOSIS — Z46.1 ENCOUNTER FOR HEARING AID FITTING OF RIGHT EAR: Primary | ICD-10-CM

## 2022-01-10 PROCEDURE — NOLTAX-P ORLEANS PRESCRIBED 4.5%: ICD-10-PCS | Mod: S$GLB,,, | Performed by: AUDIOLOGIST-HEARING AID FITTER

## 2022-01-10 PROCEDURE — NOLTAX-P ORLEANS PRESCRIBED 4.5%: Mod: S$GLB,,, | Performed by: AUDIOLOGIST-HEARING AID FITTER

## 2022-01-10 PROCEDURE — V5256 HEARING AID, DIGIT, MON, ITE: HCPCS | Mod: S$GLB,,, | Performed by: AUDIOLOGIST-HEARING AID FITTER

## 2022-01-10 PROCEDURE — V5256 PR HEARING AID, DIGIT, MON, ITE: ICD-10-PCS | Mod: S$GLB,,, | Performed by: AUDIOLOGIST-HEARING AID FITTER

## 2022-01-10 PROCEDURE — 99499 NO LOS: ICD-10-PCS | Mod: S$GLB,,, | Performed by: AUDIOLOGIST-HEARING AID FITTER

## 2022-01-10 PROCEDURE — 99499 UNLISTED E&M SERVICE: CPT | Mod: S$GLB,,, | Performed by: AUDIOLOGIST-HEARING AID FITTER

## 2022-01-10 NOTE — PROGRESS NOTES
Donell Pritchett, CCC-A  Audiologist - [facility] [address] [place] [address]  [email or web address]  [phone]    Patient: Evette Schultz   MRN: [id number]  [address] [facility]  Home Phone [phone]   Work Phone Not on file.   Mobile [phone]   : 1965  VASQUEZ: 1/10/2022      HEARING AID FITTING    Right ear:  Serial number: [id number]  : ReSound  Model: LiNX Quattro 9 ([id number])  Type: ITE  Color: Medium  Battery size:  312 (brown)  Warranty expiration: 2025  L and D expiration: 2025    Dispensed Right hearing aid today at Comfort user with default settings.  Upon insertion, patient noted feels good in her ear and calibrated.  Successfully updated firmware 0.43.0.0 --> 1.18.1.0.  Per patient request for hearing aid on auto, removed P2 and gave All-Around only in P1 - did not pair to phone.  Verified program button and volume control are activated.  Removed wireless notifications and phone now beeps, simulated low battery beeps.  Saved and briefly reviewed cerustops, batteries, program button, and volume control.  Reviewed kit with storage case, user guides, warranty card, and cleaning tools.  Will review cleaning at next appointment, as well as, review datalog and possibly increase gain to First Time user, if needed.  Evette Schultz verbalized understanding and satisfaction with today's visit and paid her balance in full.  She will call/message if service is needed before her next appointment.    _______________________________  Donell Pritchett, SHERRY-A  Audiologist

## 2022-01-28 ENCOUNTER — OFFICE VISIT (OUTPATIENT)
Dept: OTOLARYNGOLOGY | Facility: CLINIC | Age: 57
End: 2022-01-28
Payer: COMMERCIAL

## 2022-01-28 VITALS — BODY MASS INDEX: 30.08 KG/M2 | WEIGHT: 154 LBS

## 2022-01-28 DIAGNOSIS — H81.01 MENIERE'S DISEASE OF RIGHT EAR: Primary | ICD-10-CM

## 2022-01-28 DIAGNOSIS — H90.3 ASYMMETRICAL SENSORINEURAL HEARING LOSS: ICD-10-CM

## 2022-01-28 DIAGNOSIS — H93.11 TINNITUS, RIGHT EAR: ICD-10-CM

## 2022-01-28 DIAGNOSIS — Z86.69 HISTORY OF MIGRAINE: ICD-10-CM

## 2022-01-28 PROCEDURE — 99213 PR OFFICE/OUTPT VISIT, EST, LEVL III, 20-29 MIN: ICD-10-PCS | Mod: S$GLB,,, | Performed by: OTOLARYNGOLOGY

## 2022-01-28 PROCEDURE — 1159F MED LIST DOCD IN RCRD: CPT | Mod: CPTII,S$GLB,, | Performed by: OTOLARYNGOLOGY

## 2022-01-28 PROCEDURE — 3008F BODY MASS INDEX DOCD: CPT | Mod: CPTII,S$GLB,, | Performed by: OTOLARYNGOLOGY

## 2022-01-28 PROCEDURE — 99999 PR PBB SHADOW E&M-EST. PATIENT-LVL III: ICD-10-PCS | Mod: PBBFAC,,, | Performed by: OTOLARYNGOLOGY

## 2022-01-28 PROCEDURE — 1159F PR MEDICATION LIST DOCUMENTED IN MEDICAL RECORD: ICD-10-PCS | Mod: CPTII,S$GLB,, | Performed by: OTOLARYNGOLOGY

## 2022-01-28 PROCEDURE — 99213 OFFICE O/P EST LOW 20 MIN: CPT | Mod: S$GLB,,, | Performed by: OTOLARYNGOLOGY

## 2022-01-28 PROCEDURE — 99999 PR PBB SHADOW E&M-EST. PATIENT-LVL III: CPT | Mod: PBBFAC,,, | Performed by: OTOLARYNGOLOGY

## 2022-01-28 PROCEDURE — 3008F PR BODY MASS INDEX (BMI) DOCUMENTED: ICD-10-PCS | Mod: CPTII,S$GLB,, | Performed by: OTOLARYNGOLOGY

## 2022-01-28 RX ORDER — TRIAMTERENE AND HYDROCHLOROTHIAZIDE 37.5; 25 MG/1; MG/1
1 CAPSULE ORAL DAILY
Qty: 30 CAPSULE | Refills: 11 | Status: SHIPPED | OUTPATIENT
Start: 2022-01-28 | End: 2023-02-17

## 2022-01-31 ENCOUNTER — CLINICAL SUPPORT (OUTPATIENT)
Dept: OTOLARYNGOLOGY | Facility: CLINIC | Age: 57
End: 2022-01-31
Payer: COMMERCIAL

## 2022-01-31 DIAGNOSIS — Z46.1 ENCOUNTER FOR FITTING AND ADJUSTMENT OF HEARING AID: Primary | ICD-10-CM

## 2022-01-31 PROCEDURE — 99499 UNLISTED E&M SERVICE: CPT | Mod: S$GLB,,, | Performed by: AUDIOLOGIST-HEARING AID FITTER

## 2022-01-31 PROCEDURE — 99499 NO LOS: ICD-10-PCS | Mod: S$GLB,,, | Performed by: AUDIOLOGIST-HEARING AID FITTER

## 2022-01-31 NOTE — PROGRESS NOTES
Donell Pritchett, CCC-A  Audiologist - [facility] [address] [place] [address]  [email or web address]  [phone]    Patient: Evette Schultz   MRN: [id number]  [address] [facility]  Home Phone [phone]   Work Phone Not on file.   Mobile [phone]   : 1965  VASQUEZ: 2022      HEARING AID FOLLOW-UP      Evette Schultz is here today requesting a volume increase on her Right hearing aid.  She reports that she is doing well with the fit, low battery notification, and cleaning.  Increased from Comfort user to Experienced User.  Evette Schultz verbalized understanding and satisfaction with today's visit.  She will call if service is needed.    _______________________________  Donell Pritchett, SHERRY-A  Audiologist

## 2022-01-31 NOTE — PROGRESS NOTES
Subjective:       Patient ID: Evette Schultz is a 56 y.o. female.    Chief Complaint: Dizziness    HPI     Evette Schultz is a 56 y.o. female with history of AD meniere's disease x many years.  Her MD is well controlled at this time and has not had vertigo in several years.  Her previous ENT has retired and she was hoping to establish care with our clinic.  She reports right sided hearing loss and tinnitus.  Takes dyazide for MD    Review of Systems   Constitutional: Negative for chills and fever.   HENT: Positive for hearing loss and tinnitus. Negative for sore throat and trouble swallowing.    Respiratory: Negative for apnea and chest tightness.    Cardiovascular: Negative for chest pain.         Objective:      Physical Exam  Vitals and nursing note reviewed.   Constitutional:       Appearance: Normal appearance.   HENT:      Head: Normocephalic and atraumatic.      Right Ear: Tympanic membrane, ear canal and external ear normal. There is no impacted cerumen.      Left Ear: Tympanic membrane, ear canal and external ear normal. There is no impacted cerumen.   Neurological:      Mental Status: She is alert.           Data Reviewed:      Audiogram tracings independently reviewed and discussed with patient shows bilateral SNHL severe on right side with poor WRS, mild-mod on left side        Assessment:       Problem List Items Addressed This Visit    None     Visit Diagnoses     Meniere's disease of right ear    -  Primary    Tinnitus, right ear        Asymmetrical sensorineural hearing loss        History of migraine              Plan:        refill dyazide   f/u as needed

## 2022-02-10 ENCOUNTER — TELEPHONE (OUTPATIENT)
Dept: OTOLARYNGOLOGY | Facility: CLINIC | Age: 57
End: 2022-02-10
Payer: COMMERCIAL

## 2022-02-10 NOTE — TELEPHONE ENCOUNTER
Called patient to clarify message and she states [employer] needs NPI # and new diagnosis code.  Explained will discuss with management and call back with more information once I have it - verbalized understanding.    ----- Message from Xiomara Calles sent at 2/10/2022 12:47 PM CST -----    ----- Message -----  From: Bia Roberts  Sent: 2/10/2022  12:37 PM CST  To: [facility] Audio Clinical Staff    Type:  Patient Call Back    Who Called: Evette     What is the reqeust in detail: Pt is request in regards to a Claim request for insurance for visit 01/10/2022 she states they need a NPI number and procedure code and it has to be on a form from the provider rep is requesting the office provide this information to the patient. Insurance company states that the NPI number that was provided wasn't in there system and she states Xiomara Johnson  credentials need to be updated with the new NPI. Please Advise      Can the clinic reply by [facility]?    Best Call Back Number: [phone]

## 2022-03-07 ENCOUNTER — TELEPHONE (OUTPATIENT)
Dept: OTOLARYNGOLOGY | Facility: CLINIC | Age: 57
End: 2022-03-07
Payer: COMMERCIAL

## 2022-03-07 NOTE — TELEPHONE ENCOUNTER
----- Message from JEYSON Pritchett sent at 3/7/2022  1:11 AM CST -----  Please contact patient to give the requested information for her hearing aid claim:    NPI [id number]    Diagnosis Code: H90.3 bilateral sensorineural hearing loss    Thank you.

## 2022-03-07 NOTE — TELEPHONE ENCOUNTER
Spoke with patient gave her information for hearing aid claim Dr. Johnson sent me and asked me to phone in to her

## 2023-02-16 DIAGNOSIS — H81.01 MENIERE'S DISEASE OF RIGHT EAR: Primary | ICD-10-CM

## 2023-02-17 RX ORDER — TRIAMTERENE AND HYDROCHLOROTHIAZIDE 37.5; 25 MG/1; MG/1
1 CAPSULE ORAL DAILY
Qty: 90 CAPSULE | Refills: 0 | Status: SHIPPED | OUTPATIENT
Start: 2023-02-17 | End: 2023-07-18

## 2023-07-18 RX ORDER — TRIAMTERENE AND HYDROCHLOROTHIAZIDE 37.5; 25 MG/1; MG/1
1 CAPSULE ORAL DAILY
Qty: 90 CAPSULE | Refills: 0 | Status: SHIPPED | OUTPATIENT
Start: 2023-07-18 | End: 2023-10-16

## 2023-08-01 ENCOUNTER — TELEPHONE (OUTPATIENT)
Dept: OTOLARYNGOLOGY | Facility: CLINIC | Age: 58
End: 2023-08-01
Payer: COMMERCIAL

## 2023-08-01 NOTE — TELEPHONE ENCOUNTER
----- Message from Mendy Anaya sent at 7/31/2023  1:50 PM CDT -----      Name of Who is Calling: SAVITA KINGSTON [[id number]]      What is the request in detail: Pt called to schedule an appt.Please contact to further discuss and advise.          Can the clinic reply by [facility]: Y      What Number [facility] Back [facility]: [phone]

## 2023-08-10 ENCOUNTER — TELEPHONE (OUTPATIENT)
Dept: OTOLARYNGOLOGY | Facility: CLINIC | Age: 58
End: 2023-08-10
Payer: COMMERCIAL

## 2023-08-10 NOTE — TELEPHONE ENCOUNTER
Spoke with patient and moved her appointment to 10/25 to see Rusty Ramirez and Alex also put letter in mail with new dates and times

## 2023-10-23 DIAGNOSIS — H91.90 HEARING DISORDER, UNSPECIFIED LATERALITY: Primary | ICD-10-CM

## 2023-12-20 ENCOUNTER — CLINICAL SUPPORT (OUTPATIENT)
Dept: OTOLARYNGOLOGY | Facility: CLINIC | Age: 58
End: 2023-12-20
Payer: COMMERCIAL

## 2023-12-20 DIAGNOSIS — Z92.21 PERSONAL HISTORY OF CHEMOTHERAPY: ICD-10-CM

## 2023-12-20 DIAGNOSIS — R29.2 ABNORMAL ACOUSTIC REFLEX: ICD-10-CM

## 2023-12-20 DIAGNOSIS — H90.3 ASYMMETRICAL SENSORINEURAL HEARING LOSS: Primary | ICD-10-CM

## 2023-12-20 DIAGNOSIS — H93.11 TINNITUS, RIGHT EAR: ICD-10-CM

## 2023-12-20 DIAGNOSIS — H93.299 REDUCED SPEECH DISCRIMINATION: ICD-10-CM

## 2023-12-20 DIAGNOSIS — Z97.4 WEARS HEARING AID IN RIGHT EAR: ICD-10-CM

## 2023-12-20 DIAGNOSIS — Z46.1 ENCOUNTER FOR FITTING AND ADJUSTMENT OF HEARING AID: Primary | ICD-10-CM

## 2023-12-20 PROCEDURE — 92550 PR TYMPANOMETRY AND REFLEX THRESHOLD MEASUREMENTS: ICD-10-PCS | Mod: S$GLB,,, | Performed by: AUDIOLOGIST-HEARING AID FITTER

## 2023-12-20 PROCEDURE — 92557 PR COMPREHENSIVE HEARING TEST: ICD-10-PCS | Mod: S$GLB,,, | Performed by: AUDIOLOGIST-HEARING AID FITTER

## 2023-12-20 PROCEDURE — 99499 UNLISTED E&M SERVICE: CPT | Mod: ,,, | Performed by: AUDIOLOGIST-HEARING AID FITTER

## 2023-12-20 PROCEDURE — 99499 NO LOS: ICD-10-PCS | Mod: ,,, | Performed by: AUDIOLOGIST-HEARING AID FITTER

## 2023-12-20 PROCEDURE — 92557 COMPREHENSIVE HEARING TEST: CPT | Mod: S$GLB,,, | Performed by: AUDIOLOGIST-HEARING AID FITTER

## 2023-12-20 PROCEDURE — 92550 TYMPANOMETRY & REFLEX THRESH: CPT | Mod: S$GLB,,, | Performed by: AUDIOLOGIST-HEARING AID FITTER

## 2023-12-20 NOTE — PROGRESS NOTES
Donell Pritchett, CCC-A  Audiologist - [facility] [address] [place] [address]  [email or web address]  [phone]    Patient: Evette Schultz   MRN: [id number]  [address] [facility]  Home Phone [phone]   Work Phone Not on file.   Mobile [phone]   : 1965  VASQUEZ: 2023      HEARING AID FOLLOW-UP      Evette Schultz is here today for an updated audiogram and hearing aid clean/check.  She states that she is doing really well with her hearing aid and is happy with it.  Otoscopy clear AU.  Completed audiogram and noted no significant changes - sent to Dr. Ramirez for review.  Input new audiogram into JJ and discussed with patient.  Cleaned hearing aid with wipe and changed out cerustop from stock AD.  Listening check good AD.  Opened 22 session and recalculated new audiometric settings, then saved without any other changes to her settings.  Evette Schultz verbalized understanding and satisfaction with today's visit.  She will call if service is needed before her next appointment for her annual audiogram and to discuss her upcoming warranty expiration.    ReSound [id number]  #[id number] (R)  Warranty expires: 2025    _______________________________  Donell Pritchett, SHERRY-A  Audiologist

## 2023-12-20 NOTE — PROGRESS NOTES
Donell Pritchett, CCC-A  Audiologist - [facility] [address] [place] [address]  [email or web address]  [phone]    Patient: Evette Schultz   MRN: [id number]  [address] [facility]   Home Phone [phone]   Work Phone Not on file.   Mobile [phone]   : 1965  VASQUEZ: 2023      AUDIOLOGICAL EVALUATION      RECOMMENDATIONS:   It is recommended that Evette Schultz:  Follow up medically with Dr. Ramirez.    Continue wearing her Right hearing aid to improve speech understanding.  Continue to receive audiological monitoring annually.  Use precaution and/or hearing protection in noisy environments.    If you should have any questions or concerns regarding the above information, please do not hesitate to contact me at [phone].      _______________________________  Donell Pritchett, SHERRY-A  Audiologist

## 2025-03-24 ENCOUNTER — CLINICAL SUPPORT (OUTPATIENT)
Dept: AUDIOLOGY | Facility: CLINIC | Age: 60
End: 2025-03-24
Payer: COMMERCIAL

## 2025-03-24 DIAGNOSIS — H93.299 REDUCED SPEECH DISCRIMINATION: ICD-10-CM

## 2025-03-24 DIAGNOSIS — Z92.21 HISTORY OF CHEMOTHERAPY: ICD-10-CM

## 2025-03-24 DIAGNOSIS — Z97.4 WEARS HEARING AID IN RIGHT EAR: ICD-10-CM

## 2025-03-24 DIAGNOSIS — Z86.69 HISTORY OF MENIERE'S DISEASE: ICD-10-CM

## 2025-03-24 DIAGNOSIS — H91.90 HEARING DISORDER, UNSPECIFIED LATERALITY: Primary | ICD-10-CM

## 2025-03-24 DIAGNOSIS — Z46.1 ENCOUNTER FOR FITTING AND ADJUSTMENT OF HEARING AID: Primary | ICD-10-CM

## 2025-03-24 DIAGNOSIS — H90.3 ASYMMETRICAL SENSORINEURAL HEARING LOSS: Primary | ICD-10-CM

## 2025-03-24 DIAGNOSIS — H93.11 TINNITUS, RIGHT EAR: ICD-10-CM

## 2025-03-24 PROCEDURE — 99499 UNLISTED E&M SERVICE: CPT | Mod: S$GLB,,, | Performed by: AUDIOLOGIST-HEARING AID FITTER

## 2025-03-24 PROCEDURE — 99999 PR PBB SHADOW E&M-EST. PATIENT-LVL II: CPT | Mod: PBBFAC,,, | Performed by: AUDIOLOGIST-HEARING AID FITTER

## 2025-03-24 PROCEDURE — 92557 COMPREHENSIVE HEARING TEST: CPT | Mod: S$GLB,,, | Performed by: AUDIOLOGIST-HEARING AID FITTER

## 2025-03-24 PROCEDURE — 92567 TYMPANOMETRY: CPT | Mod: S$GLB,,, | Performed by: AUDIOLOGIST-HEARING AID FITTER

## 2025-03-24 NOTE — PROGRESS NOTES
Donell Pritchett, CCC-A  Audiologist - [facility]  [address] [place] [address]  [email or web address]  [phone]    Patient: Evette Schultz   MRN: [id number]  [address] [facility]  Home Phone [phone]   Work Phone Not on file.   Mobile [phone]   : 1965  VASQUEZ: 3/24/2025      HEARING AID FOLLOW-UP      Evette Schultz is here today for an updated audiogram and reports that her hearing aid has been intermittent lately.  Sent device to [employer] for repair and will address warranty extension once it is received.    _______________________________  Donell Pritchett, SHERRY-A  Audiologist

## 2025-04-14 ENCOUNTER — CLINICAL SUPPORT (OUTPATIENT)
Dept: AUDIOLOGY | Facility: CLINIC | Age: 60
End: 2025-04-14
Payer: COMMERCIAL

## 2025-04-14 DIAGNOSIS — Z46.1 ENCOUNTER FOR FITTING AND ADJUSTMENT OF HEARING AID: Primary | ICD-10-CM

## 2025-04-14 PROCEDURE — 99999 PR PBB SHADOW E&M-EST. PATIENT-LVL II: CPT | Mod: PBBFAC,,, | Performed by: AUDIOLOGIST-HEARING AID FITTER

## 2025-04-14 NOTE — PROGRESS NOTES
Donell Pritchett, CCC-A  Audiologist - [facility]  [address] [place] [address]  [email or web address]  [phone]    Patient: Evette Schultz   MRN: [id number]  [address] [facility]  Home Phone [phone]   Work Phone Not on file.   Mobile [phone]   : 1965  VASQUEZ: 2025      HEARING AID FOLLOW-UP        Evette Schultz is here today to  her hearing aid from repair and pay for her warranty extension (2 years).  Input new audiogram into [employer] and connected device then saved without any further changes.  Reviewed extended warranty charge ticket and patient paid fees in full.  Evette Schultz verbalized understanding and satisfaction with today's visit.  She will call if service is needed before her next appointment.    _______________________________  Donell Pritchett, SHERRY-A  Audiologist